# Patient Record
Sex: MALE | Race: BLACK OR AFRICAN AMERICAN | NOT HISPANIC OR LATINO
[De-identification: names, ages, dates, MRNs, and addresses within clinical notes are randomized per-mention and may not be internally consistent; named-entity substitution may affect disease eponyms.]

---

## 2022-07-30 ENCOUNTER — TELEPHONE ENCOUNTER (OUTPATIENT)
Age: 47
End: 2022-07-30

## 2022-07-31 ENCOUNTER — TELEPHONE ENCOUNTER (OUTPATIENT)
Age: 47
End: 2022-07-31

## 2023-10-03 ENCOUNTER — DASHBOARD ENCOUNTERS (OUTPATIENT)
Age: 48
End: 2023-10-03

## 2023-10-03 ENCOUNTER — TELEPHONE ENCOUNTER (OUTPATIENT)
Dept: URBAN - METROPOLITAN AREA CLINIC 9 | Facility: CLINIC | Age: 48
End: 2023-10-03

## 2023-10-03 ENCOUNTER — WEB ENCOUNTER (OUTPATIENT)
Dept: URBAN - METROPOLITAN AREA CLINIC 9 | Facility: CLINIC | Age: 48
End: 2023-10-03

## 2023-10-03 ENCOUNTER — OFFICE VISIT (OUTPATIENT)
Dept: URBAN - METROPOLITAN AREA CLINIC 9 | Facility: CLINIC | Age: 48
End: 2023-10-03
Payer: COMMERCIAL

## 2023-10-03 VITALS
SYSTOLIC BLOOD PRESSURE: 160 MMHG | WEIGHT: 202 LBS | HEIGHT: 73 IN | BODY MASS INDEX: 26.77 KG/M2 | DIASTOLIC BLOOD PRESSURE: 90 MMHG

## 2023-10-03 DIAGNOSIS — Z12.11 SCREENING FOR COLON CANCER: ICD-10-CM

## 2023-10-03 PROBLEM — 305058001: Status: ACTIVE | Noted: 2023-10-03

## 2023-10-03 PROCEDURE — 99204 OFFICE O/P NEW MOD 45 MIN: CPT | Performed by: INTERNAL MEDICINE

## 2023-10-03 RX ORDER — METOPROLOL TARTRATE 100 MG/1
TAKE ONE TABLET BY MOUTH TWICE A DAY TABLET, FILM COATED ORAL
Qty: 180 UNSPECIFIED | Refills: 1 | Status: ACTIVE | COMMUNITY

## 2023-10-03 RX ORDER — SILDENAFIL 100 MG/1
TAKE ONE TABLET BY MOUTH ONE TIME DAILY AS NEEDED FOR ERECTILE DYSFUNCTION TABLET, FILM COATED ORAL
Qty: 10 UNSPECIFIED | Refills: 0 | Status: ACTIVE | COMMUNITY

## 2023-10-03 RX ORDER — LISINOPRIL 40 MG/1
TAKE ONE TABLET BY MOUTH ONE TIME DAILY TABLET ORAL
Qty: 90 UNSPECIFIED | Refills: 0 | Status: ACTIVE | COMMUNITY

## 2023-10-03 RX ORDER — HYDROCHLOROTHIAZIDE 25 MG/1
TABLET ORAL
Qty: 30 TABLET | Status: ACTIVE | COMMUNITY

## 2023-10-03 RX ORDER — EMPAGLIFLOZIN 25 MG/1
TAKE ONE TABLET BY MOUTH ONE TIME DAILY TABLET, FILM COATED ORAL
Qty: 90 UNSPECIFIED | Refills: 0 | Status: ACTIVE | COMMUNITY

## 2023-10-03 RX ORDER — DULAGLUTIDE 1.5 MG/.5ML
INJECT THE CONTENTS OF ONE PEN UNDER THE SKIN WEEKLY ON THE SAME DAY EACH WEEK INJECTION, SOLUTION SUBCUTANEOUS
Qty: 6 UNSPECIFIED | Refills: 2 | Status: ACTIVE | COMMUNITY

## 2023-10-03 RX ORDER — CLOPIDOGREL BISULFATE 75 MG/1
TAKE ONE TABLET BY MOUTH ONE TIME DAILY TABLET, FILM COATED ORAL
Qty: 90 UNSPECIFIED | Refills: 0 | Status: ACTIVE | COMMUNITY

## 2023-10-03 RX ORDER — AMLODIPINE BESYLATE 10 MG/1
TAKE ONE TABLET BY MOUTH ONE TIME DAILY TABLET ORAL
Qty: 90 UNSPECIFIED | Refills: 0 | Status: ACTIVE | COMMUNITY

## 2023-10-03 RX ORDER — METFORMIN HCL 1000 MG/1
TAKE ONE TABLET BY MOUTH TWICE A DAY WITH A MEAL TABLET, FILM COATED ORAL
Qty: 180 UNSPECIFIED | Refills: 1 | Status: ACTIVE | COMMUNITY

## 2023-10-03 RX ORDER — ROSUVASTATIN CALCIUM 40 MG/1
TAKE ONE TABLET BY MOUTH ONE TIME DAILY TABLET, FILM COATED ORAL
Qty: 90 UNSPECIFIED | Refills: 0 | Status: ACTIVE | COMMUNITY

## 2023-10-03 NOTE — HPI-TODAY'S VISIT:
Pt here for evaluation for a screening colon . Hx/o CAD s/p PCI on plavix. PCI around 2021 . Pt is avg risk . Pt has no Major Gi. No bleeding, bowel changes, dysphagia, gerd or abd pain or weight changes . he needs a colon which is higher risk due to cad. We will need cards clearance and 5 day plavix hold. RTC prn.  .